# Patient Record
Sex: MALE | Race: WHITE | Employment: FULL TIME | URBAN - METROPOLITAN AREA
[De-identification: names, ages, dates, MRNs, and addresses within clinical notes are randomized per-mention and may not be internally consistent; named-entity substitution may affect disease eponyms.]

---

## 2017-08-21 ENCOUNTER — OFFICE VISIT (OUTPATIENT)
Dept: FAMILY MEDICINE CLINIC | Facility: CLINIC | Age: 57
End: 2017-08-21

## 2017-08-21 VITALS
DIASTOLIC BLOOD PRESSURE: 65 MMHG | WEIGHT: 183 LBS | HEIGHT: 69 IN | SYSTOLIC BLOOD PRESSURE: 143 MMHG | BODY MASS INDEX: 27.11 KG/M2 | HEART RATE: 73 BPM | TEMPERATURE: 98 F

## 2017-08-21 DIAGNOSIS — S16.1XXA STRAIN OF NECK MUSCLE, INITIAL ENCOUNTER: Primary | ICD-10-CM

## 2017-08-21 PROCEDURE — 99212 OFFICE O/P EST SF 10 MIN: CPT | Performed by: FAMILY MEDICINE

## 2017-08-21 PROCEDURE — 99204 OFFICE O/P NEW MOD 45 MIN: CPT | Performed by: FAMILY MEDICINE

## 2017-08-21 RX ORDER — ATORVASTATIN CALCIUM 40 MG/1
TABLET, FILM COATED ORAL
COMMUNITY
Start: 2014-10-06 | End: 2018-07-19

## 2017-08-21 RX ORDER — DICLOFENAC 35 MG/1
CAPSULE ORAL
COMMUNITY
Start: 2015-10-03 | End: 2018-05-23
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
## 2018-05-21 ENCOUNTER — NURSE TRIAGE (OUTPATIENT)
Dept: OTHER | Age: 58
End: 2018-05-21

## 2018-05-21 NOTE — TELEPHONE ENCOUNTER
Action Requested: Summary for Provider     []  Critical Lab, Recommendations Needed  [] Need Additional Advice  []   FYI    []   Need Orders  [] Need Medications Sent to Pharmacy  []  Other     SUMMARY: Received call from patient who reports he has been ha

## 2018-05-22 NOTE — TELEPHONE ENCOUNTER
Patient was instructed needs to be seen by his PCP in 3 days. No appointment notes. Can we add on?     Patient called back (offered an  and refused)  Patient stated was taken to [facility].  Was given an MRI and was inst

## 2018-05-23 ENCOUNTER — OFFICE VISIT (OUTPATIENT)
Dept: FAMILY MEDICINE CLINIC | Facility: CLINIC | Age: 58
End: 2018-05-23

## 2018-05-23 VITALS
HEART RATE: 86 BPM | DIASTOLIC BLOOD PRESSURE: 80 MMHG | WEIGHT: 176 LBS | BODY MASS INDEX: 26 KG/M2 | SYSTOLIC BLOOD PRESSURE: 138 MMHG | TEMPERATURE: 98 F

## 2018-05-23 DIAGNOSIS — R21 RASH: ICD-10-CM

## 2018-05-23 DIAGNOSIS — R51.9 ACUTE NONINTRACTABLE HEADACHE, UNSPECIFIED HEADACHE TYPE: Primary | ICD-10-CM

## 2018-05-23 PROCEDURE — 99214 OFFICE O/P EST MOD 30 MIN: CPT | Performed by: FAMILY MEDICINE

## 2018-05-23 PROCEDURE — 99212 OFFICE O/P EST SF 10 MIN: CPT | Performed by: FAMILY MEDICINE

## 2018-05-23 RX ORDER — NAPROXEN 500 MG/1
500 TABLET ORAL 2 TIMES DAILY WITH MEALS
COMMUNITY
End: 2018-07-19

## 2018-05-23 RX ORDER — MOMETASONE FUROATE 1 MG/G
CREAM TOPICAL
Qty: 30 G | Refills: 1 | Status: SHIPPED | OUTPATIENT
Start: 2018-05-23 | End: 2018-07-19

## 2018-05-23 NOTE — PROGRESS NOTES
5/23/2018  11:39 AM    Sindi Hirsch is a 61year old male. Chief complaint(s): Patient presents with:  ER F/U: ER follow up for severe headache    HPI:     Sindi Hirsch primary complaint is regarding TORREZ.       Sindi Hirsch is a 61year old male Social History: Smoking status: Former Smoker                                                              Packs/day: 0.00      Years: 0.00      Smokeless tobacco: Former User                     Comment: Quit >30  Alcohol use: Yes           0.0 oz/week Neck: Neck supple. Cardiovascular: Normal rate and regular rhythm. Pulmonary/Chest:   Clear to ascultation bilaterally. Neurological: No cranial nerve deficit. Skin: No rash noted.    UEs and scalp abrasion like rash   Psychiatric:   Appropriate

## 2018-07-19 ENCOUNTER — OFFICE VISIT (OUTPATIENT)
Dept: FAMILY MEDICINE CLINIC | Facility: CLINIC | Age: 58
End: 2018-07-19
Payer: COMMERCIAL

## 2018-07-19 ENCOUNTER — LAB ENCOUNTER (OUTPATIENT)
Dept: LAB | Age: 58
End: 2018-07-19
Attending: FAMILY MEDICINE
Payer: COMMERCIAL

## 2018-07-19 VITALS
TEMPERATURE: 98 F | HEIGHT: 69.5 IN | BODY MASS INDEX: 24.9 KG/M2 | DIASTOLIC BLOOD PRESSURE: 77 MMHG | HEART RATE: 68 BPM | SYSTOLIC BLOOD PRESSURE: 127 MMHG | WEIGHT: 172 LBS

## 2018-07-19 DIAGNOSIS — Z00.00 PHYSICAL EXAM: ICD-10-CM

## 2018-07-19 DIAGNOSIS — Z12.11 COLON CANCER SCREENING: ICD-10-CM

## 2018-07-19 DIAGNOSIS — Z00.00 PHYSICAL EXAM: Primary | ICD-10-CM

## 2018-07-19 LAB
ALBUMIN SERPL BCP-MCNC: 3.7 G/DL (ref 3.5–4.8)
ALBUMIN/GLOB SERPL: 1.5 {RATIO} (ref 1–2)
ALP SERPL-CCNC: 44 U/L (ref 32–100)
ALT SERPL-CCNC: 23 U/L (ref 17–63)
ANION GAP SERPL CALC-SCNC: 3 MMOL/L (ref 0–18)
AST SERPL-CCNC: 19 U/L (ref 15–41)
BACTERIA UR QL AUTO: NEGATIVE /HPF
BASOPHILS # BLD: 0 K/UL (ref 0–0.2)
BASOPHILS NFR BLD: 1 %
BILIRUB SERPL-MCNC: 0.7 MG/DL (ref 0.3–1.2)
BILIRUB UR QL: NEGATIVE
BUN SERPL-MCNC: 17 MG/DL (ref 8–20)
BUN/CREAT SERPL: 16.7 (ref 10–20)
CALCIUM SERPL-MCNC: 9.2 MG/DL (ref 8.5–10.5)
CHLORIDE SERPL-SCNC: 110 MMOL/L (ref 95–110)
CHOLEST SERPL-MCNC: 214 MG/DL (ref 110–200)
CLARITY UR: CLEAR
CO2 SERPL-SCNC: 27 MMOL/L (ref 22–32)
COLOR UR: YELLOW
CREAT SERPL-MCNC: 1.02 MG/DL (ref 0.5–1.5)
EOSINOPHIL # BLD: 0.1 K/UL (ref 0–0.7)
EOSINOPHIL NFR BLD: 2 %
ERYTHROCYTE [DISTWIDTH] IN BLOOD BY AUTOMATED COUNT: 13.8 % (ref 11–15)
GLOBULIN PLAS-MCNC: 2.4 G/DL (ref 2.5–3.7)
GLUCOSE SERPL-MCNC: 95 MG/DL (ref 70–99)
GLUCOSE UR-MCNC: NEGATIVE MG/DL
HBA1C MFR BLD: 5.7 % (ref 4–6)
HCT VFR BLD AUTO: 43.3 % (ref 41–52)
HDLC SERPL-MCNC: 44 MG/DL
HGB BLD-MCNC: 14.7 G/DL (ref 13.5–17.5)
HGB UR QL STRIP.AUTO: NEGATIVE
KETONES UR-MCNC: NEGATIVE MG/DL
LDLC SERPL CALC-MCNC: 154 MG/DL (ref 0–99)
LEUKOCYTE ESTERASE UR QL STRIP.AUTO: NEGATIVE
LYMPHOCYTES # BLD: 1.5 K/UL (ref 1–4)
LYMPHOCYTES NFR BLD: 31 %
MCH RBC QN AUTO: 31.9 PG (ref 27–32)
MCHC RBC AUTO-ENTMCNC: 34 G/DL (ref 32–37)
MCV RBC AUTO: 93.9 FL (ref 80–100)
MONOCYTES # BLD: 0.4 K/UL (ref 0–1)
MONOCYTES NFR BLD: 8 %
NEUTROPHILS # BLD AUTO: 3 K/UL (ref 1.8–7.7)
NEUTROPHILS NFR BLD: 59 %
NITRITE UR QL STRIP.AUTO: NEGATIVE
NONHDLC SERPL-MCNC: 170 MG/DL
OSMOLALITY UR CALC.SUM OF ELEC: 291 MOSM/KG (ref 275–295)
PATIENT FASTING: YES
PH UR: 6 [PH] (ref 5–8)
PLATELET # BLD AUTO: 222 K/UL (ref 140–400)
PMV BLD AUTO: 7.6 FL (ref 7.4–10.3)
POTASSIUM SERPL-SCNC: 4.2 MMOL/L (ref 3.3–5.1)
PROT SERPL-MCNC: 6.1 G/DL (ref 5.9–8.4)
PROT UR-MCNC: NEGATIVE MG/DL
PSA SERPL-MCNC: 1.2 NG/ML (ref 0–4)
RBC # BLD AUTO: 4.61 M/UL (ref 4.5–5.9)
RBC #/AREA URNS AUTO: 1 /HPF
SODIUM SERPL-SCNC: 140 MMOL/L (ref 136–144)
SP GR UR STRIP: 1.03 (ref 1–1.03)
TRIGL SERPL-MCNC: 82 MG/DL (ref 1–149)
TSH SERPL-ACNC: 1.67 UIU/ML (ref 0.45–5.33)
UROBILINOGEN UR STRIP-ACNC: <2
VIT C UR-MCNC: NEGATIVE MG/DL
WBC # BLD AUTO: 5 K/UL (ref 4–11)
WBC #/AREA URNS AUTO: 0 /HPF

## 2018-07-19 PROCEDURE — 36415 COLL VENOUS BLD VENIPUNCTURE: CPT

## 2018-07-19 PROCEDURE — 84153 ASSAY OF PSA TOTAL: CPT | Performed by: FAMILY MEDICINE

## 2018-07-19 PROCEDURE — 83036 HEMOGLOBIN GLYCOSYLATED A1C: CPT

## 2018-07-19 PROCEDURE — 81001 URINALYSIS AUTO W/SCOPE: CPT | Performed by: FAMILY MEDICINE

## 2018-07-19 PROCEDURE — 81015 MICROSCOPIC EXAM OF URINE: CPT | Performed by: FAMILY MEDICINE

## 2018-07-19 PROCEDURE — 82306 VITAMIN D 25 HYDROXY: CPT | Performed by: FAMILY MEDICINE

## 2018-07-19 PROCEDURE — 84443 ASSAY THYROID STIM HORMONE: CPT

## 2018-07-19 PROCEDURE — 85025 COMPLETE CBC W/AUTO DIFF WBC: CPT

## 2018-07-19 PROCEDURE — 99396 PREV VISIT EST AGE 40-64: CPT | Performed by: FAMILY MEDICINE

## 2018-07-19 PROCEDURE — 80061 LIPID PANEL: CPT

## 2018-07-19 PROCEDURE — 80053 COMPREHEN METABOLIC PANEL: CPT

## 2018-07-19 NOTE — PROGRESS NOTES
7/19/2018  9:06 AM    Aiden Nguyen is a 61year old male. Chief complaint(s): Patient presents with:  Routine Physical    HPI:     Aiden Nguyen primary complaint is regarding CPE.      Aiden Nguyen is a 61year old male is here for routine perio tinnitus. Eyes: Negative for pain and visual disturbance. Respiratory: Negative for apnea, cough, chest tightness, shortness of breath and wheezing. Cardiovascular: Negative for chest pain, palpitations and leg swelling.    Gastrointestinal: Negativ appearance and bowel sounds are normal. He exhibits no distension and no mass. There is no splenomegaly or hepatomegaly. There is no tenderness.  Hernia confirmed negative in the ventral area, confirmed negative in the right inguinal area and confirmed nega than 20 oz. a day; dental care ), and Healthy habits& Social competence & Responsibilities: Recommendations on physical activity; exercise daily or at least 3 times a week for 30-60 minutes doing cardiovascular exercise.  Encourage to maintain the best phys

## 2018-07-20 LAB — 25(OH)D3 SERPL-MCNC: 36.1 NG/ML

## 2018-08-24 ENCOUNTER — OFFICE VISIT (OUTPATIENT)
Dept: DERMATOLOGY CLINIC | Facility: CLINIC | Age: 58
End: 2018-08-24
Payer: COMMERCIAL

## 2018-08-24 DIAGNOSIS — L73.9 FOLLICULITIS: Primary | ICD-10-CM

## 2018-08-24 DIAGNOSIS — L30.9 DERMATITIS: ICD-10-CM

## 2018-08-24 PROCEDURE — 99202 OFFICE O/P NEW SF 15 MIN: CPT | Performed by: DERMATOLOGY

## 2018-08-24 PROCEDURE — 99212 OFFICE O/P EST SF 10 MIN: CPT | Performed by: DERMATOLOGY

## 2018-08-24 RX ORDER — DOXYCYCLINE HYCLATE 100 MG/1
100 CAPSULE ORAL DAILY
Qty: 30 CAPSULE | Refills: 2 | Status: SHIPPED | OUTPATIENT
Start: 2018-08-24 | End: 2018-09-23

## 2018-08-24 RX ORDER — CLOBETASOL PROPIONATE 0.46 MG/ML
1 SOLUTION TOPICAL 2 TIMES DAILY
Qty: 50 ML | Refills: 6 | Status: SHIPPED | OUTPATIENT
Start: 2018-08-24 | End: 2019-08-24

## 2018-09-03 NOTE — PROGRESS NOTES
Nahed Whitehead is a 61year old male. Patient presents with:  Derm Problem: New pt presents with pruritic lesions to scalp, face, and bilateral arms for 3-4 years. Has tried mometasone with min improvement. Patient has no known allergies. History Main Topics   Smoking status: Former Smoker     Smokeless tobacco: Former User    Comment: Quit >30    Alcohol use Yes  0.0 oz/week     Comment: occ    Drug use: No    Sexual activity: Not on file     Other Topics Concern    Reaction to local anest diagnosis)  Dermatitis      Dermatitis.  /Folliculitis. Mupirocin to new excoriated crusted areas. Clobetasol to scalp may use this over new areas on arms and legs. Taper off doxycycline. The fact this may aggravate his peptic ulcer disease discussed.

## 2019-02-14 ENCOUNTER — NURSE TRIAGE (OUTPATIENT)
Dept: OTHER | Age: 59
End: 2019-02-14

## 2019-02-14 NOTE — TELEPHONE ENCOUNTER
Action Requested: Summary for Provider     []  Critical Lab, Recommendations Needed  [] Need Additional Advice  []   FYI    []   Need Orders  [] Need Medications Sent to Pharmacy  []  Other     SUMMARY: Pt going to [facility] for further evaluation of left eye di

## 2019-02-16 ENCOUNTER — OFFICE VISIT (OUTPATIENT)
Dept: FAMILY MEDICINE CLINIC | Facility: CLINIC | Age: 59
End: 2019-02-16
Payer: COMMERCIAL

## 2019-02-16 VITALS
BODY MASS INDEX: 26.5 KG/M2 | DIASTOLIC BLOOD PRESSURE: 77 MMHG | SYSTOLIC BLOOD PRESSURE: 128 MMHG | RESPIRATION RATE: 18 BRPM | TEMPERATURE: 97 F | HEART RATE: 78 BPM | HEIGHT: 69.5 IN | WEIGHT: 183 LBS

## 2019-02-16 DIAGNOSIS — H00.036 CELLULITIS OF LEFT EYELID: Primary | ICD-10-CM

## 2019-02-16 DIAGNOSIS — H10.32 ACUTE BACTERIAL CONJUNCTIVITIS OF LEFT EYE: ICD-10-CM

## 2019-02-16 PROCEDURE — 99213 OFFICE O/P EST LOW 20 MIN: CPT | Performed by: FAMILY MEDICINE

## 2019-02-16 PROCEDURE — 99212 OFFICE O/P EST SF 10 MIN: CPT | Performed by: FAMILY MEDICINE

## 2019-02-16 RX ORDER — AMOXICILLIN 500 MG/1
500 CAPSULE ORAL 2 TIMES DAILY
Qty: 20 CAPSULE | Refills: 0 | Status: SHIPPED | OUTPATIENT
Start: 2019-02-16 | End: 2019-02-26

## 2019-02-16 RX ORDER — NEOMYCIN/POLYMYXIN B/HYDROCORT 3.5-10K-1
SUSPENSION, DROPS(FINAL DOSAGE FORM)(ML) OPHTHALMIC (EYE)
Qty: 1 BOTTLE | Refills: 0 | Status: SHIPPED | OUTPATIENT
Start: 2019-02-16 | End: 2021-10-11 | Stop reason: ALTCHOICE

## 2019-02-16 NOTE — PROGRESS NOTES
2/16/2019  12:21 PM    Josh Arita is a 60year old male. Chief complaint(s): Patient presents with:   Infection: Patient present with left eye infected x 5 days - swollen,itchy and painful    HPI:     Josh Arita primary complaint is regarding a Oral Cap Take 1 capsule (500 mg total) by mouth 2 (two) times daily for 10 days. Disp: 20 capsule Rfl: 0   Clobetasol Propionate 0.05 % External Solution Apply 1 mL topically 2 (two) times daily.  Disp: 50 mL Rfl: 6   mupirocin 2 % External Ointment Apply 1 Cellulitis of left eyelid  (primary encounter diagnosis)  Acute bacterial conjunctivitis of left eye      MEDICATIONS:   Requested Prescriptions     Signed Prescriptions Disp Refills   • Neomycin-Polymyxin-HC [id number] Ophthalmic Suspension 1 Bottle 0

## 2019-05-15 ENCOUNTER — APPOINTMENT (OUTPATIENT)
Dept: CT IMAGING | Facility: HOSPITAL | Age: 59
End: 2019-05-15
Attending: EMERGENCY MEDICINE
Payer: COMMERCIAL

## 2019-05-15 ENCOUNTER — HOSPITAL ENCOUNTER (EMERGENCY)
Facility: HOSPITAL | Age: 59
Discharge: HOME | End: 2019-05-15
Attending: EMERGENCY MEDICINE
Payer: COMMERCIAL

## 2019-05-15 VITALS
OXYGEN SATURATION: 97 % | TEMPERATURE: 98 F | DIASTOLIC BLOOD PRESSURE: 91 MMHG | HEART RATE: 95 BPM | BODY MASS INDEX: 27.11 KG/M2 | RESPIRATION RATE: 20 BRPM | HEIGHT: 69 IN | SYSTOLIC BLOOD PRESSURE: 139 MMHG | WEIGHT: 183 LBS

## 2019-05-15 DIAGNOSIS — N20.1 URETERAL STONE: ICD-10-CM

## 2019-05-15 DIAGNOSIS — N23 RENAL COLIC: Primary | ICD-10-CM

## 2019-05-15 PROCEDURE — 74176 CT ABD & PELVIS W/O CONTRAST: CPT | Performed by: EMERGENCY MEDICINE

## 2019-05-15 PROCEDURE — 96365 THER/PROPH/DIAG IV INF INIT: CPT

## 2019-05-15 PROCEDURE — 85025 COMPLETE CBC W/AUTO DIFF WBC: CPT | Performed by: EMERGENCY MEDICINE

## 2019-05-15 PROCEDURE — 80048 BASIC METABOLIC PNL TOTAL CA: CPT | Performed by: EMERGENCY MEDICINE

## 2019-05-15 PROCEDURE — 81003 URINALYSIS AUTO W/O SCOPE: CPT | Performed by: EMERGENCY MEDICINE

## 2019-05-15 PROCEDURE — 99284 EMERGENCY DEPT VISIT MOD MDM: CPT

## 2019-05-15 RX ORDER — TAMSULOSIN HYDROCHLORIDE 0.4 MG/1
0.4 CAPSULE ORAL DAILY
Qty: 7 CAPSULE | Refills: 0 | Status: SHIPPED | OUTPATIENT
Start: 2019-05-15 | End: 2019-05-22

## 2019-05-15 RX ORDER — KETOROLAC TROMETHAMINE 15 MG/ML
15 INJECTION, SOLUTION INTRAMUSCULAR; INTRAVENOUS ONCE
Status: COMPLETED | OUTPATIENT
Start: 2019-05-15 | End: 2019-05-15

## 2019-05-15 RX ORDER — KETOROLAC TROMETHAMINE 10 MG/1
10 TABLET, FILM COATED ORAL EVERY 6 HOURS PRN
Qty: 20 TABLET | Refills: 0 | Status: SHIPPED | OUTPATIENT
Start: 2019-05-15 | End: 2019-05-20

## 2019-05-15 NOTE — ED PROVIDER NOTES
Patient Seen in: [facility] AND [facility] Emergency Department    History   Patient presents with:  Abdomen/Flank Pain (GI/)    Stated Complaint: abd pain    HPI    Patient is a 62-year-old male with no no significant past medical history at onset about 1030 Pulmonary/Chest: Effort normal and breath sounds normal. No respiratory distress. Abdominal: Soft. Bowel sounds are normal. Exhibits no distension and no mass. There is tenderness in the right flank and right lower quadrant.   Here is no rebound and no calculus. 3. Moderate prostate enlargement. 4. Mild hepatic steatosis. 5. Nonspecific coronary artery calcification. 6. Small bilateral fat containing inguinal hernias and tiny fat containing umbilical hernia.  7.    Dictated by (CST): Lida Romero MD on

## 2020-11-05 ENCOUNTER — HOSPITAL ENCOUNTER (OUTPATIENT)
Age: 60
Discharge: HOME | End: 2020-11-05
Attending: EMERGENCY MEDICINE
Payer: COMMERCIAL

## 2020-11-05 VITALS
SYSTOLIC BLOOD PRESSURE: 125 MMHG | DIASTOLIC BLOOD PRESSURE: 68 MMHG | TEMPERATURE: 99 F | HEART RATE: 88 BPM | RESPIRATION RATE: 20 BRPM | OXYGEN SATURATION: 98 %

## 2020-11-05 DIAGNOSIS — B34.9 VIRAL SYNDROME: Primary | ICD-10-CM

## 2020-11-05 DIAGNOSIS — Z20.822 ENCOUNTER FOR LABORATORY TESTING FOR COVID-19 VIRUS: ICD-10-CM

## 2020-11-05 PROCEDURE — 99213 OFFICE O/P EST LOW 20 MIN: CPT | Performed by: EMERGENCY MEDICINE

## 2020-11-06 NOTE — ED INITIAL ASSESSMENT (HPI)
2 days of headache and body aches, could have been exposed to covid, states he's never had this type of headache.  Pain is on top of head

## 2020-11-06 NOTE — ED PROVIDER NOTES
Patient Seen in: [facility]      History   Patient presents with:  Headache    Stated Complaint: HA    HPI  Patient complains of body aches, fatigue, headache and chills. He thinks he was having fevers.   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      Left Ear: External ear normal.   Eyes:      Conjunctiva/sclera: Conjunctivae normal.   Neck:      Musculoskeletal: Normal range of motion and neck supple.    Cardiovascular:      Rate and Rhythm: Normal rate and reg as soon as possible for a visit in 2 days  For follow-up          Medications Prescribed:  Current Discharge Medication List

## 2020-11-12 ENCOUNTER — TELEPHONE (OUTPATIENT)
Dept: FAMILY MEDICINE CLINIC | Facility: CLINIC | Age: 60
End: 2020-11-12

## 2020-11-12 ENCOUNTER — TELEMEDICINE (OUTPATIENT)
Dept: FAMILY MEDICINE CLINIC | Facility: CLINIC | Age: 60
End: 2020-11-12
Payer: COMMERCIAL

## 2020-11-12 DIAGNOSIS — U07.1 COVID-19 VIRUS INFECTION: Primary | ICD-10-CM

## 2020-11-12 PROCEDURE — 99213 OFFICE O/P EST LOW 20 MIN: CPT | Performed by: PHYSICIAN ASSISTANT

## 2020-11-13 ENCOUNTER — TELEPHONE (OUTPATIENT)
Dept: FAMILY MEDICINE CLINIC | Facility: CLINIC | Age: 60
End: 2020-11-13

## 2020-11-13 NOTE — PROGRESS NOTES
HPI: HPI  Patient was diagnosed with positive Covid 19 on 11/5/20. Patient complaints of sore throat, lack of appetite and headache. Patient denies of chest pain, SOB, N/V/C/D, fever, dizziness, syncope, abdominal pain.  There are no other concerns toda status: Former Smoker      Smokeless tobacco: Former User      Tobacco comment: Quit >30    Substance and Sexual Activity      Alcohol use:  Yes        Alcohol/week: 0.0 standard drinks        Comment: occ      Drug use: No      Sexual activity: Not on file orient x3, able to carry on conversation easily, without shortness of breath, coughing, can speak in full sentences.     Assessment and Plan[de-identified]     Problem List Items Addressed This Visit     None      Visit Diagnoses     COVID-19 virus infection    -  Prima

## 2020-11-13 NOTE — TELEPHONE ENCOUNTER
Received [facility] paperwork via fax at [facility] location.

## 2020-11-19 NOTE — TELEPHONE ENCOUNTER
Rashad,     Please sign off on form: FMLA 11/5/20- pending recovery   -Highlight the patient and hit \"Chart\" button. -In Chart Review, w/in the Encounter tab - click 1 time on the Telephone call encounter for 11/13/20 Scroll down the telephone encounter.

## 2021-10-11 ENCOUNTER — OFFICE VISIT (OUTPATIENT)
Dept: FAMILY MEDICINE CLINIC | Facility: CLINIC | Age: 61
End: 2021-10-11
Payer: COMMERCIAL

## 2021-10-11 ENCOUNTER — TELEPHONE (OUTPATIENT)
Dept: FAMILY MEDICINE CLINIC | Facility: CLINIC | Age: 61
End: 2021-10-11

## 2021-10-11 VITALS
RESPIRATION RATE: 18 BRPM | BODY MASS INDEX: 26.99 KG/M2 | DIASTOLIC BLOOD PRESSURE: 70 MMHG | SYSTOLIC BLOOD PRESSURE: 134 MMHG | HEART RATE: 77 BPM | WEIGHT: 182.25 LBS | HEIGHT: 69 IN

## 2021-10-11 DIAGNOSIS — Z12.11 COLON CANCER SCREENING: ICD-10-CM

## 2021-10-11 DIAGNOSIS — Z00.00 PHYSICAL EXAM: Primary | ICD-10-CM

## 2021-10-11 PROCEDURE — 99396 PREV VISIT EST AGE 40-64: CPT | Performed by: FAMILY MEDICINE

## 2021-10-11 PROCEDURE — 90686 IIV4 VACC NO PRSV 0.5 ML IM: CPT | Performed by: FAMILY MEDICINE

## 2021-10-11 PROCEDURE — 90471 IMMUNIZATION ADMIN: CPT | Performed by: FAMILY MEDICINE

## 2021-10-11 PROCEDURE — 3075F SYST BP GE 130 - 139MM HG: CPT | Performed by: FAMILY MEDICINE

## 2021-10-11 PROCEDURE — 3008F BODY MASS INDEX DOCD: CPT | Performed by: FAMILY MEDICINE

## 2021-10-11 PROCEDURE — 3078F DIAST BP <80 MM HG: CPT | Performed by: FAMILY MEDICINE

## 2021-10-11 NOTE — TELEPHONE ENCOUNTER
Patient can schedule an appointment whenever he wishes for his shingles vaccine as he refused to get it on todays visit. Please help pt schedule a Nurse visit.

## 2021-10-11 NOTE — PROGRESS NOTES
10/11/2021  12:02 PM    Gibson Lepe is a 57year old male. Chief complaint(s): Patient presents with:  Routine Physical    HPI:     Gibson Lepe primary complaint is regarding CPE.      Gibson Lepe is a 57year old male is here for routine per External Ointment Apply 1 Application topically 3 (three) times daily. (Patient not taking: Reported on 10/11/2021) 15 g 3       Allergies:  No Known Allergies      ROS:   Review of Systems   Constitutional: Negative for appetite change, fatigue and fever. normal.      Breath sounds: Normal breath sounds. Abdominal:      General: Abdomen is flat. Bowel sounds are normal.      Palpations: Abdomen is soft. There is no hepatomegaly, splenomegaly or mass. Tenderness: There is no abdominal tenderness. and high-carbohydrate foods); athletic conditioning, fluids; low fat milk, limit to less than 20 oz. a day; dental care ), and Healthy habits& Social competence & Responsibilities: Recommendations on physical activity; exercise daily or at least 3 times a

## 2021-10-11 NOTE — TELEPHONE ENCOUNTER
Patient is wanting to know when he can schedule for shingles vaccine. No order in system. Please advise.

## 2021-10-30 ENCOUNTER — LAB ENCOUNTER (OUTPATIENT)
Dept: LAB | Age: 61
End: 2021-10-30
Attending: FAMILY MEDICINE
Payer: COMMERCIAL

## 2021-10-30 ENCOUNTER — EKG ENCOUNTER (OUTPATIENT)
Dept: LAB | Age: 61
End: 2021-10-30
Attending: FAMILY MEDICINE
Payer: COMMERCIAL

## 2021-10-30 DIAGNOSIS — Z00.00 PHYSICAL EXAM: ICD-10-CM

## 2021-10-30 PROCEDURE — 93010 ELECTROCARDIOGRAM REPORT: CPT | Performed by: FAMILY MEDICINE

## 2021-10-30 PROCEDURE — 93005 ELECTROCARDIOGRAM TRACING: CPT

## 2021-10-30 PROCEDURE — 81001 URINALYSIS AUTO W/SCOPE: CPT | Performed by: FAMILY MEDICINE

## 2021-10-30 PROCEDURE — 85025 COMPLETE CBC W/AUTO DIFF WBC: CPT

## 2021-10-30 PROCEDURE — 83036 HEMOGLOBIN GLYCOSYLATED A1C: CPT

## 2021-10-30 PROCEDURE — 36415 COLL VENOUS BLD VENIPUNCTURE: CPT

## 2021-10-30 PROCEDURE — 84443 ASSAY THYROID STIM HORMONE: CPT

## 2021-10-30 PROCEDURE — 81015 MICROSCOPIC EXAM OF URINE: CPT | Performed by: FAMILY MEDICINE

## 2021-10-30 PROCEDURE — 82306 VITAMIN D 25 HYDROXY: CPT | Performed by: FAMILY MEDICINE

## 2021-10-30 PROCEDURE — 80061 LIPID PANEL: CPT

## 2021-10-30 PROCEDURE — 80053 COMPREHEN METABOLIC PANEL: CPT

## 2021-10-30 PROCEDURE — 84153 ASSAY OF PSA TOTAL: CPT | Performed by: FAMILY MEDICINE

## 2021-10-30 RX ORDER — ERGOCALCIFEROL 1.25 MG/1
50000 CAPSULE ORAL WEEKLY
Qty: 12 CAPSULE | Refills: 4 | Status: SHIPPED | OUTPATIENT
Start: 2021-10-30 | End: 2021-11-29

## 2021-11-13 ENCOUNTER — NURSE ONLY (OUTPATIENT)
Dept: FAMILY MEDICINE CLINIC | Facility: CLINIC | Age: 61
End: 2021-11-13
Payer: COMMERCIAL

## 2021-11-13 DIAGNOSIS — Z23 IMMUNIZATION DUE: Primary | ICD-10-CM

## 2021-11-13 PROCEDURE — 90750 HZV VACC RECOMBINANT IM: CPT | Performed by: FAMILY MEDICINE

## 2021-11-13 PROCEDURE — 90471 IMMUNIZATION ADMIN: CPT | Performed by: FAMILY MEDICINE

## 2021-11-15 NOTE — PROGRESS NOTES
Patient here for shingles vaccine. Verbal consent obtained to administer. Patient tolerated vaccine well advised to f/u in 2 months for second dose.

## 2022-01-15 ENCOUNTER — NURSE ONLY (OUTPATIENT)
Dept: FAMILY MEDICINE CLINIC | Facility: CLINIC | Age: 62
End: 2022-01-15
Payer: COMMERCIAL

## 2022-01-15 DIAGNOSIS — Z23 NEED FOR SHINGLES VACCINE: Primary | ICD-10-CM

## 2022-01-15 PROCEDURE — 90750 HZV VACC RECOMBINANT IM: CPT | Performed by: FAMILY MEDICINE

## 2022-01-15 PROCEDURE — 90471 IMMUNIZATION ADMIN: CPT | Performed by: FAMILY MEDICINE

## 2022-01-15 NOTE — PROGRESS NOTES
Patient is here for the second shingles vaccines. Verified full name and . Patient tolerated vaccine well.

## 2022-08-17 ENCOUNTER — HOSPITAL ENCOUNTER (OUTPATIENT)
Age: 62
Discharge: HOME | End: 2022-08-17
Payer: COMMERCIAL

## 2022-08-17 VITALS
HEIGHT: 69 IN | HEART RATE: 92 BPM | WEIGHT: 185 LBS | OXYGEN SATURATION: 99 % | TEMPERATURE: 98 F | SYSTOLIC BLOOD PRESSURE: 141 MMHG | RESPIRATION RATE: 18 BRPM | DIASTOLIC BLOOD PRESSURE: 82 MMHG | BODY MASS INDEX: 27.4 KG/M2

## 2022-08-17 DIAGNOSIS — Z20.822 ENCOUNTER FOR LABORATORY TESTING FOR COVID-19 VIRUS: Primary | ICD-10-CM

## 2022-08-17 DIAGNOSIS — U07.1 COVID: ICD-10-CM

## 2022-08-17 LAB — SARS-COV-2 RNA RESP QL NAA+PROBE: DETECTED

## 2022-08-17 PROCEDURE — 99213 OFFICE O/P EST LOW 20 MIN: CPT | Performed by: NURSE PRACTITIONER

## 2022-08-17 PROCEDURE — U0002 COVID-19 LAB TEST NON-CDC: HCPCS | Performed by: NURSE PRACTITIONER

## 2022-09-02 ENCOUNTER — NURSE TRIAGE (OUTPATIENT)
Dept: FAMILY MEDICINE CLINIC | Facility: CLINIC | Age: 62
End: 2022-09-02

## 2022-09-04 NOTE — TELEPHONE ENCOUNTER
Patient does not use MyChart. Please call pt on 9/6/22 when we return to office. At time of this note, patient has not visited an [facility] ED or IC, please seek a f/u from him.

## 2022-12-03 ENCOUNTER — OFFICE VISIT (OUTPATIENT)
Dept: FAMILY MEDICINE CLINIC | Facility: CLINIC | Age: 62
End: 2022-12-03
Payer: COMMERCIAL

## 2022-12-03 VITALS
DIASTOLIC BLOOD PRESSURE: 81 MMHG | HEIGHT: 69 IN | SYSTOLIC BLOOD PRESSURE: 159 MMHG | HEART RATE: 85 BPM | BODY MASS INDEX: 27.11 KG/M2 | WEIGHT: 183 LBS

## 2022-12-03 DIAGNOSIS — Z12.11 COLON CANCER SCREENING: ICD-10-CM

## 2022-12-03 DIAGNOSIS — Z00.00 PHYSICAL EXAM: Primary | ICD-10-CM

## 2022-12-03 PROCEDURE — 3008F BODY MASS INDEX DOCD: CPT | Performed by: FAMILY MEDICINE

## 2022-12-03 PROCEDURE — 90686 IIV4 VACC NO PRSV 0.5 ML IM: CPT | Performed by: FAMILY MEDICINE

## 2022-12-03 PROCEDURE — 3079F DIAST BP 80-89 MM HG: CPT | Performed by: FAMILY MEDICINE

## 2022-12-03 PROCEDURE — 99396 PREV VISIT EST AGE 40-64: CPT | Performed by: FAMILY MEDICINE

## 2022-12-03 PROCEDURE — 90471 IMMUNIZATION ADMIN: CPT | Performed by: FAMILY MEDICINE

## 2022-12-03 PROCEDURE — 3077F SYST BP >= 140 MM HG: CPT | Performed by: FAMILY MEDICINE

## 2022-12-17 ENCOUNTER — LAB ENCOUNTER (OUTPATIENT)
Dept: LAB | Age: 62
End: 2022-12-17
Attending: FAMILY MEDICINE
Payer: COMMERCIAL

## 2022-12-17 ENCOUNTER — EKG ENCOUNTER (OUTPATIENT)
Dept: LAB | Age: 62
End: 2022-12-17
Attending: FAMILY MEDICINE
Payer: COMMERCIAL

## 2022-12-17 DIAGNOSIS — Z00.00 PHYSICAL EXAM: ICD-10-CM

## 2022-12-17 LAB
ALBUMIN SERPL-MCNC: 4 G/DL (ref 3.4–5)
ALBUMIN/GLOB SERPL: 1.4 {RATIO} (ref 1–2)
ALP LIVER SERPL-CCNC: 62 U/L
ALT SERPL-CCNC: 32 U/L
ANION GAP SERPL CALC-SCNC: 4 MMOL/L (ref 0–18)
AST SERPL-CCNC: 15 U/L (ref 15–37)
BASOPHILS # BLD AUTO: 0.05 X10(3) UL (ref 0–0.2)
BASOPHILS NFR BLD AUTO: 1 %
BILIRUB SERPL-MCNC: 0.6 MG/DL (ref 0.1–2)
BILIRUB UR QL: NEGATIVE
BUN BLD-MCNC: 17 MG/DL (ref 7–18)
BUN/CREAT SERPL: 16.3 (ref 10–20)
CALCIUM BLD-MCNC: 9.4 MG/DL (ref 8.5–10.1)
CHLORIDE SERPL-SCNC: 107 MMOL/L (ref 98–112)
CHOLEST SERPL-MCNC: 237 MG/DL (ref ?–200)
CLARITY UR: CLEAR
CO2 SERPL-SCNC: 30 MMOL/L (ref 21–32)
COLOR UR: YELLOW
CREAT BLD-MCNC: 1.04 MG/DL
DEPRECATED RDW RBC AUTO: 43.2 FL (ref 35.1–46.3)
EOSINOPHIL # BLD AUTO: 0.1 X10(3) UL (ref 0–0.7)
EOSINOPHIL NFR BLD AUTO: 2 %
ERYTHROCYTE [DISTWIDTH] IN BLOOD BY AUTOMATED COUNT: 12.4 % (ref 11–15)
EST. AVERAGE GLUCOSE BLD GHB EST-MCNC: 128 MG/DL (ref 68–126)
FASTING PATIENT LIPID ANSWER: YES
FASTING STATUS PATIENT QL REPORTED: YES
GFR SERPLBLD BASED ON 1.73 SQ M-ARVRAT: 81 ML/MIN/1.73M2 (ref 60–?)
GLOBULIN PLAS-MCNC: 2.9 G/DL (ref 2.8–4.4)
GLUCOSE BLD-MCNC: 104 MG/DL (ref 70–99)
GLUCOSE UR-MCNC: NEGATIVE MG/DL
HBA1C MFR BLD: 6.1 % (ref ?–5.7)
HCT VFR BLD AUTO: 45.5 %
HDLC SERPL-MCNC: 45 MG/DL (ref 40–59)
HEMOCCULT STL QL: POSITIVE
HGB BLD-MCNC: 15.3 G/DL
HGB UR QL STRIP.AUTO: NEGATIVE
IMM GRANULOCYTES # BLD AUTO: 0.02 X10(3) UL (ref 0–1)
IMM GRANULOCYTES NFR BLD: 0.4 %
KETONES UR-MCNC: NEGATIVE MG/DL
LDLC SERPL CALC-MCNC: 162 MG/DL (ref ?–100)
LEUKOCYTE ESTERASE UR QL STRIP.AUTO: NEGATIVE
LYMPHOCYTES # BLD AUTO: 1.62 X10(3) UL (ref 1–4)
LYMPHOCYTES NFR BLD AUTO: 33.1 %
MCH RBC QN AUTO: 31.5 PG (ref 26–34)
MCHC RBC AUTO-ENTMCNC: 33.6 G/DL (ref 31–37)
MCV RBC AUTO: 93.8 FL
MONOCYTES # BLD AUTO: 0.34 X10(3) UL (ref 0.1–1)
MONOCYTES NFR BLD AUTO: 7 %
NEUTROPHILS # BLD AUTO: 2.76 X10 (3) UL (ref 1.5–7.7)
NEUTROPHILS # BLD AUTO: 2.76 X10(3) UL (ref 1.5–7.7)
NEUTROPHILS NFR BLD AUTO: 56.5 %
NITRITE UR QL STRIP.AUTO: NEGATIVE
NONHDLC SERPL-MCNC: 192 MG/DL (ref ?–130)
OSMOLALITY SERPL CALC.SUM OF ELEC: 294 MOSM/KG (ref 275–295)
PH UR: 7 [PH] (ref 5–8)
PLATELET # BLD AUTO: 248 10(3)UL (ref 150–450)
POTASSIUM SERPL-SCNC: 4.3 MMOL/L (ref 3.5–5.1)
PROT SERPL-MCNC: 6.9 G/DL (ref 6.4–8.2)
PROT UR-MCNC: NEGATIVE MG/DL
PSA SERPL-MCNC: 3.02 NG/ML (ref ?–4)
RBC # BLD AUTO: 4.85 X10(6)UL
SODIUM SERPL-SCNC: 141 MMOL/L (ref 136–145)
SP GR UR STRIP: 1.02 (ref 1–1.03)
TRIGL SERPL-MCNC: 164 MG/DL (ref 30–149)
TSI SER-ACNC: 2.5 MIU/ML (ref 0.36–3.74)
UROBILINOGEN UR STRIP-ACNC: 0.2
VIT D+METAB SERPL-MCNC: 25.8 NG/ML (ref 30–100)
VLDLC SERPL CALC-MCNC: 32 MG/DL (ref 0–30)
WBC # BLD AUTO: 4.9 X10(3) UL (ref 4–11)

## 2022-12-17 PROCEDURE — 84443 ASSAY THYROID STIM HORMONE: CPT

## 2022-12-17 PROCEDURE — 83036 HEMOGLOBIN GLYCOSYLATED A1C: CPT

## 2022-12-17 PROCEDURE — 80061 LIPID PANEL: CPT

## 2022-12-17 PROCEDURE — 93005 ELECTROCARDIOGRAM TRACING: CPT

## 2022-12-17 PROCEDURE — 82306 VITAMIN D 25 HYDROXY: CPT

## 2022-12-17 PROCEDURE — 85025 COMPLETE CBC W/AUTO DIFF WBC: CPT

## 2022-12-17 PROCEDURE — 93010 ELECTROCARDIOGRAM REPORT: CPT | Performed by: INTERNAL MEDICINE

## 2022-12-17 PROCEDURE — 82274 ASSAY TEST FOR BLOOD FECAL: CPT | Performed by: FAMILY MEDICINE

## 2022-12-17 PROCEDURE — 84153 ASSAY OF PSA TOTAL: CPT

## 2022-12-17 PROCEDURE — 36415 COLL VENOUS BLD VENIPUNCTURE: CPT

## 2022-12-17 PROCEDURE — 81003 URINALYSIS AUTO W/O SCOPE: CPT

## 2022-12-17 PROCEDURE — 80053 COMPREHEN METABOLIC PANEL: CPT

## 2022-12-18 DIAGNOSIS — Z12.11 COLON CANCER SCREENING: ICD-10-CM

## 2022-12-18 DIAGNOSIS — R19.5 POSITIVE OCCULT STOOL BLOOD TEST: Primary | ICD-10-CM

## 2022-12-18 RX ORDER — ERGOCALCIFEROL 1.25 MG/1
50000 CAPSULE ORAL WEEKLY
Qty: 12 CAPSULE | Refills: 4 | Status: SHIPPED | OUTPATIENT
Start: 2022-12-18 | End: 2023-01-17

## 2022-12-19 LAB
ATRIAL RATE: 66 BPM
P AXIS: 68 DEGREES
P-R INTERVAL: 176 MS
Q-T INTERVAL: 390 MS
QRS DURATION: 96 MS
QTC CALCULATION (BEZET): 408 MS
R AXIS: -13 DEGREES
T AXIS: 27 DEGREES
VENTRICULAR RATE: 66 BPM

## 2023-12-05 ENCOUNTER — HOSPITAL ENCOUNTER (OUTPATIENT)
Age: 63
Discharge: HOME | End: 2023-12-05
Payer: COMMERCIAL

## 2023-12-05 ENCOUNTER — NURSE TRIAGE (OUTPATIENT)
Dept: FAMILY MEDICINE CLINIC | Facility: CLINIC | Age: 63
End: 2023-12-05

## 2023-12-05 VITALS
SYSTOLIC BLOOD PRESSURE: 139 MMHG | HEART RATE: 103 BPM | TEMPERATURE: 97 F | RESPIRATION RATE: 22 BRPM | OXYGEN SATURATION: 95 % | DIASTOLIC BLOOD PRESSURE: 74 MMHG

## 2023-12-05 DIAGNOSIS — J06.9 VIRAL UPPER RESPIRATORY TRACT INFECTION: ICD-10-CM

## 2023-12-05 DIAGNOSIS — R05.9 COUGH: Primary | ICD-10-CM

## 2023-12-05 LAB
S PYO AG THROAT QL: NEGATIVE
SARS-COV-2 RNA RESP QL NAA+PROBE: NOT DETECTED

## 2023-12-05 PROCEDURE — 87880 STREP A ASSAY W/OPTIC: CPT | Performed by: NURSE PRACTITIONER

## 2023-12-05 PROCEDURE — 99213 OFFICE O/P EST LOW 20 MIN: CPT | Performed by: NURSE PRACTITIONER

## 2023-12-05 PROCEDURE — U0002 COVID-19 LAB TEST NON-CDC: HCPCS | Performed by: NURSE PRACTITIONER

## 2023-12-05 RX ORDER — BENZONATATE 100 MG/1
100 CAPSULE ORAL 3 TIMES DAILY PRN
Qty: 30 CAPSULE | Refills: 0 | Status: SHIPPED | OUTPATIENT
Start: 2023-12-05 | End: 2024-01-04

## 2023-12-05 NOTE — TELEPHONE ENCOUNTER
Patient seeking appt today. Patient c/o cough, and bodyaches. Symptoms for 8 days. Cough is constant. No sob, no wheezing. Patient needs evaluation. Appt given for available provider at [address] [place] [address] today.        Reason for Disposition   Continuous (nonstop) coughing interferes with work or school and no improvement using cough treatment per Care Advice    Protocols used: Cough-A-OH

## 2024-03-09 ENCOUNTER — LAB ENCOUNTER (OUTPATIENT)
Dept: LAB | Age: 64
End: 2024-03-09
Attending: FAMILY MEDICINE
Payer: COMMERCIAL

## 2024-03-09 ENCOUNTER — OFFICE VISIT (OUTPATIENT)
Dept: FAMILY MEDICINE CLINIC | Facility: CLINIC | Age: 64
End: 2024-03-09
Payer: COMMERCIAL

## 2024-03-09 VITALS
HEART RATE: 68 BPM | HEIGHT: 69 IN | SYSTOLIC BLOOD PRESSURE: 160 MMHG | WEIGHT: 187 LBS | BODY MASS INDEX: 27.7 KG/M2 | DIASTOLIC BLOOD PRESSURE: 80 MMHG

## 2024-03-09 DIAGNOSIS — Z13.31 POSITIVE SCREENING FOR DEPRESSION ON 9-ITEM PATIENT HEALTH QUESTIONNAIRE (PHQ-9): Chronic | ICD-10-CM

## 2024-03-09 DIAGNOSIS — F32.A DEPRESSIVE DISORDER: Primary | Chronic | ICD-10-CM

## 2024-03-09 DIAGNOSIS — R73.9 HYPERGLYCEMIA: ICD-10-CM

## 2024-03-09 DIAGNOSIS — I10 ESSENTIAL HYPERTENSION: Chronic | ICD-10-CM

## 2024-03-09 LAB
ANION GAP SERPL CALC-SCNC: 4 MMOL/L (ref 0–18)
BILIRUB UR QL: NEGATIVE
BUN BLD-MCNC: 12 MG/DL (ref 9–23)
BUN/CREAT SERPL: 12.6 (ref 10–20)
CALCIUM BLD-MCNC: 9.6 MG/DL (ref 8.7–10.4)
CHLORIDE SERPL-SCNC: 108 MMOL/L (ref 98–112)
CLARITY UR: CLEAR
CO2 SERPL-SCNC: 29 MMOL/L (ref 21–32)
CREAT BLD-MCNC: 0.95 MG/DL
EGFRCR SERPLBLD CKD-EPI 2021: 89 ML/MIN/1.73M2 (ref 60–?)
EST. AVERAGE GLUCOSE BLD GHB EST-MCNC: 128 MG/DL (ref 68–126)
FASTING STATUS PATIENT QL REPORTED: NO
GLUCOSE BLD-MCNC: 170 MG/DL (ref 70–99)
GLUCOSE UR-MCNC: NORMAL MG/DL
HBA1C MFR BLD: 6.1 % (ref ?–5.7)
HGB UR QL STRIP.AUTO: NEGATIVE
KETONES UR-MCNC: NEGATIVE MG/DL
LEUKOCYTE ESTERASE UR QL STRIP.AUTO: NEGATIVE
NITRITE UR QL STRIP.AUTO: NEGATIVE
OSMOLALITY SERPL CALC.SUM OF ELEC: 296 MOSM/KG (ref 275–295)
PH UR: 6.5 [PH] (ref 5–8)
POTASSIUM SERPL-SCNC: 4 MMOL/L (ref 3.5–5.1)
PROT UR-MCNC: NEGATIVE MG/DL
SODIUM SERPL-SCNC: 141 MMOL/L (ref 136–145)
SP GR UR STRIP: 1.01 (ref 1–1.03)
UROBILINOGEN UR STRIP-ACNC: NORMAL

## 2024-03-09 PROCEDURE — 36415 COLL VENOUS BLD VENIPUNCTURE: CPT

## 2024-03-09 PROCEDURE — 81003 URINALYSIS AUTO W/O SCOPE: CPT

## 2024-03-09 PROCEDURE — 80048 BASIC METABOLIC PNL TOTAL CA: CPT

## 2024-03-09 PROCEDURE — 83036 HEMOGLOBIN GLYCOSYLATED A1C: CPT

## 2024-03-09 PROCEDURE — 99215 OFFICE O/P EST HI 40 MIN: CPT | Performed by: FAMILY MEDICINE

## 2024-03-09 RX ORDER — AMLODIPINE BESYLATE AND BENAZEPRIL HYDROCHLORIDE 5; 20 MG/1; MG/1
1 CAPSULE ORAL DAILY
Qty: 90 CAPSULE | Refills: 0 | Status: SHIPPED | OUTPATIENT
Start: 2024-03-09

## 2024-03-09 NOTE — PROGRESS NOTES
Patient ID: Quentin Guadalupe is a 63 year old male.    HPI  Chief Complaint   Patient presents with    Depression     This is the first time the patient is seen by me. Patient usually sees Dr. Easton.     Pt is  and is a .    Pt c/o depressive moods that began in 2017. His PHQ-9 score is 18. He states his marriage is going well. Pt was fired from his job in 2017 without any explanation. He had no family support or motivation. His wife was supportive, but his siblings and children were not. Pt states he doesn't get along well with his children as much as he would like to. He never saw a therapist about this nor tried taking medications. Denies suicidal thoughts. Denies anxiety.  I got the sense that he was not very proud of himself and when I asked him this he started crying and does admit to feeling like a failure.    He states he was taking Losartan 50 mg from [place] and ran out 4 days ago. Pt has been taking it for 6 months. He didn't complete any kidney function panel recently. I discussed this with him.       Wt Readings from Last 6 Encounters:   03/09/24 187 lb   12/03/22 183 lb   08/17/22 185 lb   10/11/21 182 lb 4 oz   05/15/19 183 lb   02/16/19 183 lb       BMI Readings from Last 6 Encounters:   03/09/24 27.62 kg/m²   12/03/22 27.02 kg/m²   08/17/22 27.32 kg/m²   10/11/21 26.91 kg/m²   05/15/19 27.02 kg/m²   02/16/19 26.64 kg/m²       BP Readings from Last 6 Encounters:   03/09/24 160/80   12/05/23 139/74   12/03/22 159/81   08/17/22 141/82   10/11/21 134/70   11/05/20 125/68         Review of Systems   Respiratory:  Negative for shortness of breath.    Cardiovascular:  Negative for chest pain.   Psychiatric/Behavioral:  Positive for dysphoric mood. Negative for suicidal ideas. The patient is not nervous/anxious.            Medical History:      Past Medical History:   Diagnosis Date    Chicken pox     Diverticulosis 2013    Essential hypertension     Hyperlipidemia     Lipid  screening 07/18/2013    Per [employer]    PUD (peptic ulcer disease) 1998       No past surgical history on file.       No current outpatient medications on file.     Allergies:No Known Allergies     Physical Exam:       Physical Exam  Blood pressure 156/86, pulse 68, height 5' 9\" (1.753 m), weight 187 lb.    Vitals:    03/09/24 1005 03/09/24 1020   BP: 156/86 160/80   Pulse: 68    Weight: 187 lb    Height: 5' 9\" (1.753 m)        Physical Exam   Constitutional: Patient is oriented to person, place, and time. Patient appears well-developed and well-nourished. No distress.   Head: Normocephalic.   Eyes: Conjunctivae and EOM are normal.   Neurological: Patient is alert and oriented to person, place, and time.   Skin: Skin is warm.   Psychiatry: Depressed mood.  Not suicidal.  Tearful talking about how he feels about himself.  Very nice gentleman.  Heart regular rate and rhythm without murmurs  Lungs clear to auscultation.    Vitals reviewed.           Assessment/Plan:      Diagnoses and all orders for this visit:    Depressive disorder  -     OP REFERRAL TO [facility]  -     sertraline 50 MG Oral Tab; Take 1/2 tablet po daily for 1 week and then go to 1 full tablet each day from then on.  (for mood)  He needs to see a therapist and I went ahead and did a referral.  I also discussed the benefits of the medication such as sertraline which she will start.  Positive screening for depression on 9-item Patient Health Questionnaire (PHQ-9)  -     OP REFERRAL TO [facility]  -     sertraline 50 MG Oral Tab; Take 1/2 tablet po daily for 1 week and then go to 1 full tablet each day from then on.  (for mood)    Essential hypertension  -     amLODIPine Besy-Benazepril HCl 5-20 MG Oral Cap; Take 1 capsule by mouth daily. For blood pressure.  -     Basic Metabolic Panel (8); Future  -     Urinalysis with Culture Reflex; Future  I do not think losartan 50 mg is going to control his blood pressure and started him on Lotrel 5/20 mg instead and  we will get some lab work done today.  He would like to follow-up with me in 5 weeks, sooner if any issues.  Hyperglycemia  -     Hemoglobin A1C; Future        Referrals (if applicable)  No orders of the defined types were placed in this encounter.      Follow up if symptoms persist.  Take medicine (if given) as prescribed.  Approach to treatment discussed and patient/family member understands and agrees to plan.     Return in about 5 weeks (around 4/13/2024) for Mood Medicine Followup, High Blood Pressure followup.    There are no Patient Instructions on file for this visit.    Elver Collier    3/9/2024    By signing my name below, IElver,  attest that this documentation has been prepared under the direction and in the presence of Jose Izaguirre DO.   Electronically Signed: Elver Collier, 3/9/2024, 10:07 AM.      I, Jose Izaguirre DO,  personally performed the services described in this documentation. All medical record entries made by the scribe were at my direction and in my presence.  I have reviewed the chart and discharge instructions (if applicable) and agree that the record reflects my personal performance and is accurate and complete.  Jose Izaguirre DO, 3/9/2024, 10:33 AM

## 2024-06-06 DIAGNOSIS — I10 ESSENTIAL HYPERTENSION: Chronic | ICD-10-CM

## 2024-06-10 RX ORDER — AMLODIPINE BESYLATE AND BENAZEPRIL HYDROCHLORIDE 5; 20 MG/1; MG/1
1 CAPSULE ORAL DAILY
Qty: 90 CAPSULE | Refills: 0 | OUTPATIENT
Start: 2024-06-10

## 2024-06-10 NOTE — TELEPHONE ENCOUNTER
Please review.  Protocol failed / Has no protocol.     Requested Prescriptions   Pending Prescriptions Disp Refills    AMLODIPINE BESY-BENAZEPRIL HCL 5-20 MG Oral Cap [Pharmacy Med Name: AMLODIPINE-BENAZ 5/20MG CAPSULES] 90 capsule 0     Sig: Take 1 capsule by mouth daily. For blood pressure.       Hypertension Medications Protocol Failed - 6/10/2024 10:01 AM        Failed - Last BP reading less than 140/90     BP Readings from Last 1 Encounters:   03/09/24 160/80               Passed - CMP or BMP in past 12 months        Passed - In person appointment or virtual visit in the past 12 mos or appointment in next 3 mos     Recent Outpatient Visits              3 months ago Depressive disorder    [facility]    Office Visit    1 year ago Physical exam    [facility]    Office Visit    2 years ago Need for shingles vaccine    [facility]    Nurse Only    2 years ago Immunization due    [facility]    [facility]    2 years ago Physical exam    [facility] [place]    Office Visit                      Passed - EGFRCR or GFRNAA > 50     GFR Evaluation  EGFRCR: 89 , resulted on 3/9/2024               Recent Outpatient Visits              3 months ago Depressive disorder    [facility]    Office Visit    1 year ago Physical exam    [facility] [place]    Office Visit    2 years ago Need for shingles vaccine    [facility]    2 years ago Immunization due    [facility]    2 years ago Physical exam    [facility]  [place]    Office Visit

## 2024-06-10 NOTE — TELEPHONE ENCOUNTER
Return in about 5 weeks (around 4/13/2024) for Mood Medicine Followup, High Blood Pressure followup.  After Visit Summary (Printed 3/9/2024)

## 2024-06-11 NOTE — TELEPHONE ENCOUNTER
Patient's spouse called back on this.  Patient is completely out of medication.  Most recently seen and prescribed by Dr. Izaguirre.  Please advise on refill request.

## 2024-06-13 RX ORDER — AMLODIPINE BESYLATE AND BENAZEPRIL HYDROCHLORIDE 5; 20 MG/1; MG/1
1 CAPSULE ORAL DAILY
Qty: 10 CAPSULE | Refills: 0 | Status: SHIPPED | OUTPATIENT
Start: 2024-06-13

## 2024-06-13 NOTE — TELEPHONE ENCOUNTER
Patient called back in [place]; he states he is returning our call. He is asking why he is only getting 10 pills. He states he does want to see Dr. Easton for follow-up visit, but work schedule makes it hard to schedule visit [Monday-Friday from 8 am - 8 pm. He is asking for an office visit this Saturday at any time. I made him aware I will convey the above to Dr. Easton. Patient verbalized understanding. No further questions or concerns at this time.    Dr. Easton to advise office visit time for this Saturday, 6/15/24

## 2024-07-13 ENCOUNTER — OFFICE VISIT (OUTPATIENT)
Dept: FAMILY MEDICINE CLINIC | Facility: CLINIC | Age: 64
End: 2024-07-13
Payer: COMMERCIAL

## 2024-07-13 VITALS
DIASTOLIC BLOOD PRESSURE: 80 MMHG | HEART RATE: 70 BPM | WEIGHT: 178 LBS | HEIGHT: 69 IN | SYSTOLIC BLOOD PRESSURE: 140 MMHG | BODY MASS INDEX: 26.36 KG/M2

## 2024-07-13 DIAGNOSIS — R73.03 PREDIABETES: ICD-10-CM

## 2024-07-13 DIAGNOSIS — I10 HYPERTENSION, UNSPECIFIED TYPE: ICD-10-CM

## 2024-07-13 DIAGNOSIS — Z12.11 SCREENING FOR MALIGNANT NEOPLASM OF COLON: ICD-10-CM

## 2024-07-13 DIAGNOSIS — I10 ESSENTIAL HYPERTENSION: Chronic | ICD-10-CM

## 2024-07-13 DIAGNOSIS — Z00.00 WELL ADULT EXAM: Primary | ICD-10-CM

## 2024-07-13 DIAGNOSIS — E55.9 VITAMIN D DEFICIENCY: ICD-10-CM

## 2024-07-13 RX ORDER — AMLODIPINE BESYLATE AND BENAZEPRIL HYDROCHLORIDE 5; 20 MG/1; MG/1
1 CAPSULE ORAL DAILY
Qty: 90 CAPSULE | Refills: 0 | Status: SHIPPED | OUTPATIENT
Start: 2024-07-13

## 2024-07-13 NOTE — PROGRESS NOTES
HPI    Patient presents for annual physical.  Positive for past medical history; hypertension, depression, vitamin d deficiency, prediabetes.      Nocturia - negative.   Colonoscopy - never has completed, agreeable to cologuard.   Diet - diet is fair. Eats home cooked meals.   Exercise - walks a lot at work, otherwise sedentary.   Immunizations - up to date.     Review of Systems   All other systems reviewed and are negative.       Vitals:    07/13/24 0718   BP: 148/78   Pulse: 70   Weight: 178 lb (80.7 kg)   Height: 5' 9\" (1.753 m)     Body mass index is 26.29 kg/m².    Health Maintenance   Topic Date Due    COVID-19 Vaccine (3 - 2023-24 season) 09/01/2023    Annual Physical  12/03/2023    Colorectal Cancer Screening  12/17/2023    HTN: BP Follow-Up  04/09/2024    Influenza Vaccine (1) 10/01/2024    PSA  12/17/2024    DTaP,Tdap,and Td Vaccines (2 - Td or Tdap) 07/06/2029    Annual Depression Screening  Completed    Zoster Vaccines  Completed    Pneumococcal Vaccine: Birth to 64yrs  Aged Out       Past Medical History:    Chicken pox    Diverticulosis    Essential hypertension    Hyperlipidemia    Lipid screening    Per [facility]    PUD (peptic ulcer disease)       .History reviewed. No pertinent surgical history.    Family History   Problem Relation Age of Onset    Stroke Mother         CVA x2  (cause of death)    Hypertension Mother     Lipids Mother         Hyperlipidemia    Diabetes Mother         Type 2    Other (Other) Father         Medical history unknown       Social History     Socioeconomic History    Marital status:      Spouse name: Not on file    Number of children: Not on file    Years of education: Not on file    Highest education level: Not on file   Occupational History    Not on file   Tobacco Use    Smoking status: Former    Smokeless tobacco: Former    Tobacco comments:     Quit >30   Vaping Use    Vaping status: Never Used   Substance and Sexual Activity    Alcohol use: Yes      Alcohol/week: 0.0 standard drinks of alcohol     Comment: occ    Drug use: No    Sexual activity: Not on file   Other Topics Concern    Grew up on a farm Not Asked    History of tanning Not Asked    Outdoor occupation Not Asked    Reaction to local anesthetic No   Social History Narrative    Not on file     Social Determinants of Health     Financial Resource Strain: Not on file   Food Insecurity: Not on file   Transportation Needs: Not on file   Physical Activity: Not on file   Stress: Not on file   Social Connections: Not on file   Housing Stability: Not on file       Current Outpatient Medications   Medication Sig Dispense Refill    amLODIPine Besy-Benazepril HCl 5-20 MG Oral Cap Take 1 capsule by mouth daily. For blood pressure. 90 capsule 0    sertraline 50 MG Oral Tab Take 1/2 tablet po daily for 1 week and then go to 1 full tablet each day from then on.  (for mood) (Patient not taking: Reported on 7/13/2024) 90 tablet 0       Allergies:  No Known Allergies    Physical Exam  Vitals and nursing note reviewed.   Constitutional:       General: He is not in acute distress.     Appearance: Normal appearance. He is well-developed. He is not ill-appearing, toxic-appearing or diaphoretic.   HENT:      Head: Normocephalic and atraumatic.      Right Ear: Hearing, tympanic membrane, ear canal and external ear normal. There is no impacted cerumen.      Left Ear: Hearing, tympanic membrane, ear canal and external ear normal. There is no impacted cerumen.      Nose: Nose normal. No congestion.      Mouth/Throat:      Mouth: Mucous membranes are moist.      Pharynx: Oropharynx is clear. No oropharyngeal exudate or posterior oropharyngeal erythema.   Eyes:      General:         Right eye: No discharge.         Left eye: No discharge.      Conjunctiva/sclera: Conjunctivae normal.   Neck:      Thyroid: No thyromegaly.   Cardiovascular:      Rate and Rhythm: Normal rate and regular rhythm.      Pulses: Normal pulses.      Heart  sounds: Normal heart sounds. No murmur heard.  Pulmonary:      Effort: Pulmonary effort is normal. No respiratory distress.      Breath sounds: Normal breath sounds. No stridor. No wheezing, rhonchi or rales.   Chest:      Chest wall: No tenderness.   Abdominal:      General: Abdomen is flat. Bowel sounds are normal. There is no distension.      Palpations: Abdomen is soft. There is no mass.      Tenderness: There is no abdominal tenderness. There is no guarding or rebound.      Hernia: No hernia is present.   Musculoskeletal:         General: Normal range of motion.      Cervical back: Normal range of motion and neck supple.   Lymphadenopathy:      Cervical: No cervical adenopathy.   Skin:     General: Skin is warm and dry.   Neurological:      Mental Status: He is alert and oriented to person, place, and time.   Psychiatric:         Mood and Affect: Mood normal.         Behavior: Behavior normal.         Thought Content: Thought content normal.         Judgment: Judgment normal.          Assessment and Plan:   Problem List Items Addressed This Visit    None  Visit Diagnoses       Well adult exam    -  Primary    Relevant Medications    amLODIPine Besy-Benazepril HCl 5-20 MG Oral Cap    Other Relevant Orders    Lipid Panel    Hemoglobin A1C    Comp Metabolic Panel (14)    CBC With Differential With Platelet    TSH W Reflex To Free T4    Vitamin D    Vitamin B12    COLOGUARD COLON CANCER SCREENING (EXTERNAL)    Essential hypertension  (Chronic)       Relevant Medications    amLODIPine Besy-Benazepril HCl 5-20 MG Oral Cap    Prediabetes        Relevant Medications    amLODIPine Besy-Benazepril HCl 5-20 MG Oral Cap    Other Relevant Orders    Hemoglobin A1C    Vitamin D deficiency        Relevant Orders    Comp Metabolic Panel (14)    Vitamin D    Hypertension, unspecified type        Relevant Medications    amLODIPine Besy-Benazepril HCl 5-20 MG Oral Cap    Screening for malignant neoplasm of colon        Relevant  Orders    COLOGUARD COLON CANCER SCREENING (EXTERNAL)           Fasting labs, cologuard ordered.  Refills of bp meds to pharmacy on file.      Discussed plan of care with patient and patient is in agreement.  All questions answered. Patient to call with questions or concerns.    Encouraged to sign up for My Chart if not already registered.

## 2024-07-20 ENCOUNTER — LAB ENCOUNTER (OUTPATIENT)
Dept: LAB | Age: 64
End: 2024-07-20
Attending: NURSE PRACTITIONER
Payer: COMMERCIAL

## 2024-07-20 DIAGNOSIS — E55.9 VITAMIN D DEFICIENCY: ICD-10-CM

## 2024-07-20 DIAGNOSIS — R73.03 PREDIABETES: ICD-10-CM

## 2024-07-20 DIAGNOSIS — Z00.00 WELL ADULT EXAM: ICD-10-CM

## 2024-07-20 LAB
ALBUMIN SERPL-MCNC: 4.3 G/DL (ref 3.2–4.8)
ALBUMIN/GLOB SERPL: 1.8 {RATIO} (ref 1–2)
ALP LIVER SERPL-CCNC: 55 U/L
ALT SERPL-CCNC: 21 U/L
ANION GAP SERPL CALC-SCNC: 7 MMOL/L (ref 0–18)
AST SERPL-CCNC: 20 U/L (ref ?–34)
BASOPHILS # BLD AUTO: 0.05 X10(3) UL (ref 0–0.2)
BASOPHILS NFR BLD AUTO: 1 %
BILIRUB SERPL-MCNC: 0.7 MG/DL (ref 0.2–1.1)
BUN BLD-MCNC: 14 MG/DL (ref 9–23)
BUN/CREAT SERPL: 13 (ref 10–20)
CALCIUM BLD-MCNC: 9.3 MG/DL (ref 8.7–10.4)
CHLORIDE SERPL-SCNC: 110 MMOL/L (ref 98–112)
CHOLEST SERPL-MCNC: 233 MG/DL (ref ?–200)
CO2 SERPL-SCNC: 26 MMOL/L (ref 21–32)
CREAT BLD-MCNC: 1.08 MG/DL
DEPRECATED RDW RBC AUTO: 43.7 FL (ref 35.1–46.3)
EGFRCR SERPLBLD CKD-EPI 2021: 77 ML/MIN/1.73M2 (ref 60–?)
EOSINOPHIL # BLD AUTO: 0.15 X10(3) UL (ref 0–0.7)
EOSINOPHIL NFR BLD AUTO: 3 %
ERYTHROCYTE [DISTWIDTH] IN BLOOD BY AUTOMATED COUNT: 12.7 % (ref 11–15)
EST. AVERAGE GLUCOSE BLD GHB EST-MCNC: 131 MG/DL (ref 68–126)
FASTING PATIENT LIPID ANSWER: YES
FASTING STATUS PATIENT QL REPORTED: YES
GLOBULIN PLAS-MCNC: 2.4 G/DL (ref 2–3.5)
GLUCOSE BLD-MCNC: 106 MG/DL (ref 70–99)
HBA1C MFR BLD: 6.2 % (ref ?–5.7)
HCT VFR BLD AUTO: 44 %
HDLC SERPL-MCNC: 44 MG/DL (ref 40–59)
HGB BLD-MCNC: 15.2 G/DL
IMM GRANULOCYTES # BLD AUTO: 0.04 X10(3) UL (ref 0–1)
IMM GRANULOCYTES NFR BLD: 0.8 %
LDLC SERPL CALC-MCNC: 168 MG/DL (ref ?–100)
LYMPHOCYTES # BLD AUTO: 1.46 X10(3) UL (ref 1–4)
LYMPHOCYTES NFR BLD AUTO: 29.2 %
MCH RBC QN AUTO: 32.1 PG (ref 26–34)
MCHC RBC AUTO-ENTMCNC: 34.5 G/DL (ref 31–37)
MCV RBC AUTO: 92.8 FL
MONOCYTES # BLD AUTO: 0.41 X10(3) UL (ref 0.1–1)
MONOCYTES NFR BLD AUTO: 8.2 %
NEUTROPHILS # BLD AUTO: 2.89 X10 (3) UL (ref 1.5–7.7)
NEUTROPHILS # BLD AUTO: 2.89 X10(3) UL (ref 1.5–7.7)
NEUTROPHILS NFR BLD AUTO: 57.8 %
NONHDLC SERPL-MCNC: 189 MG/DL (ref ?–130)
OSMOLALITY SERPL CALC.SUM OF ELEC: 297 MOSM/KG (ref 275–295)
PLATELET # BLD AUTO: 217 10(3)UL (ref 150–450)
POTASSIUM SERPL-SCNC: 4.8 MMOL/L (ref 3.5–5.1)
PROT SERPL-MCNC: 6.7 G/DL (ref 5.7–8.2)
RBC # BLD AUTO: 4.74 X10(6)UL
SODIUM SERPL-SCNC: 143 MMOL/L (ref 136–145)
TRIGL SERPL-MCNC: 116 MG/DL (ref 30–149)
TSI SER-ACNC: 2.65 MIU/ML (ref 0.55–4.78)
VIT B12 SERPL-MCNC: 396 PG/ML (ref 211–911)
VIT D+METAB SERPL-MCNC: 23.7 NG/ML (ref 30–100)
VLDLC SERPL CALC-MCNC: 23 MG/DL (ref 0–30)
WBC # BLD AUTO: 5 X10(3) UL (ref 4–11)

## 2024-07-20 PROCEDURE — 85025 COMPLETE CBC W/AUTO DIFF WBC: CPT

## 2024-07-20 PROCEDURE — 82607 VITAMIN B-12: CPT

## 2024-07-20 PROCEDURE — 83036 HEMOGLOBIN GLYCOSYLATED A1C: CPT

## 2024-07-20 PROCEDURE — 80053 COMPREHEN METABOLIC PANEL: CPT

## 2024-07-20 PROCEDURE — 82306 VITAMIN D 25 HYDROXY: CPT

## 2024-07-20 PROCEDURE — 80061 LIPID PANEL: CPT

## 2024-07-20 PROCEDURE — 84443 ASSAY THYROID STIM HORMONE: CPT

## 2024-07-20 PROCEDURE — 36415 COLL VENOUS BLD VENIPUNCTURE: CPT

## 2024-07-25 DIAGNOSIS — E78.2 MIXED HYPERLIPIDEMIA: Primary | ICD-10-CM

## 2024-07-25 RX ORDER — ATORVASTATIN CALCIUM 40 MG/1
40 TABLET, FILM COATED ORAL NIGHTLY
Qty: 90 TABLET | Refills: 3 | Status: SHIPPED | OUTPATIENT
Start: 2024-07-25

## 2024-11-19 DIAGNOSIS — I10 ESSENTIAL HYPERTENSION: Chronic | ICD-10-CM

## 2024-11-19 DIAGNOSIS — I10 HYPERTENSION, UNSPECIFIED TYPE: ICD-10-CM

## 2024-11-19 DIAGNOSIS — Z00.00 WELL ADULT EXAM: ICD-10-CM

## 2024-11-23 ENCOUNTER — OFFICE VISIT (OUTPATIENT)
Dept: FAMILY MEDICINE CLINIC | Facility: CLINIC | Age: 64
End: 2024-11-23

## 2024-11-23 VITALS
HEART RATE: 78 BPM | SYSTOLIC BLOOD PRESSURE: 139 MMHG | HEIGHT: 69 IN | WEIGHT: 184.63 LBS | BODY MASS INDEX: 27.35 KG/M2 | DIASTOLIC BLOOD PRESSURE: 77 MMHG

## 2024-11-23 DIAGNOSIS — I10 ESSENTIAL HYPERTENSION: Primary | ICD-10-CM

## 2024-11-23 PROCEDURE — 99213 OFFICE O/P EST LOW 20 MIN: CPT | Performed by: NURSE PRACTITIONER

## 2024-11-23 RX ORDER — AMLODIPINE AND BENAZEPRIL HYDROCHLORIDE 5; 20 MG/1; MG/1
1 CAPSULE ORAL DAILY
Qty: 90 CAPSULE | Refills: 0 | OUTPATIENT
Start: 2024-11-23

## 2024-11-23 RX ORDER — ATORVASTATIN CALCIUM 40 MG/1
40 TABLET, FILM COATED ORAL NIGHTLY
COMMUNITY

## 2024-11-23 RX ORDER — LOSARTAN POTASSIUM 50 MG/1
TABLET ORAL DAILY
COMMUNITY
End: 2024-11-23

## 2024-11-23 RX ORDER — AMLODIPINE AND BENAZEPRIL HYDROCHLORIDE 5; 20 MG/1; MG/1
1 CAPSULE ORAL DAILY
Qty: 90 CAPSULE | Refills: 3 | Status: SHIPPED | OUTPATIENT
Start: 2024-11-23

## 2024-11-23 NOTE — PROGRESS NOTES
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  Went yesterday and had an elevated blood pressure.  Stopped taking amlodipine/benazepril as well as atorvastatin.  Started taking losartan 50 mg which he states he bought in [place].      Review of Systems   All other systems reviewed and are negative.       Vitals:    11/23/24 0831   BP: 139/77   Pulse: 78   Weight: 184 lb 9.6 oz (83.7 kg)   Height: 5' 9\" (1.753 m)     Body mass index is 27.26 kg/m².    Health Maintenance   Topic Date Due    Colorectal Cancer Screening  12/17/2023    Fall Risk Screening (Annual)  01/01/2024    HTN: BP Follow-Up  08/13/2024    COVID-19 Vaccine (3 - 2024-25 season) 09/01/2024    Influenza Vaccine (1) 10/01/2024    Pneumococcal Vaccine: 65+ Years (1 of 1 - PCV) Never done    PSA  12/17/2024    Annual Physical  07/13/2025    Annual Depression Screening  Completed    Zoster Vaccines  Completed       Past Medical History:    Chicken pox    Diverticulosis    Essential hypertension    Hyperlipidemia    Lipid screening    Per [facility]    PUD (peptic ulcer disease)       .No past surgical history on file.    Family History   Problem Relation Age of Onset    Stroke Mother         CVA x2  (cause of death)    Hypertension Mother     Lipids Mother         Hyperlipidemia    Diabetes Mother         Type 2    Other (Other) Father         Medical history unknown       Social History     Socioeconomic History    Marital status:      Spouse name: Not on file    Number of children: Not on file    Years of education: Not on file    Highest education level: Not on file   Occupational History    Not on file   Tobacco Use    Smoking status: Former    Smokeless tobacco: Former    Tobacco comments:     Quit >30   Vaping Use    Vaping status: Never Used   Substance and Sexual Activity    Alcohol use: Yes     Alcohol/week: 0.0 standard drinks of alcohol     Comment: occ    Drug use: No    Sexual activity: Not on file   Other Topics Concern    Grew  up on a farm Not Asked    History of tanning Not Asked    Outdoor occupation Not Asked    Reaction to local anesthetic No   Social History Narrative    Not on file     Social Drivers of Health     Financial Resource Strain: Not on file   Food Insecurity: Not on file   Transportation Needs: Not on file   Physical Activity: Not on file   Stress: Not on file   Social Connections: Not on file   Housing Stability: Not on file       Current Outpatient Medications   Medication Sig Dispense Refill    amLODIPine Besy-Benazepril HCl 5-20 MG Oral Cap Take 1 capsule by mouth daily. For blood pressure. 90 capsule 3    atorvastatin 40 MG Oral Tab Take 1 tablet (40 mg total) by mouth nightly.         Allergies:  Allergies[1]    Physical Exam  Vitals and nursing note reviewed.   Constitutional:       General: He is not in acute distress.     Appearance: Normal appearance. He is not ill-appearing.   HENT:      Head: Normocephalic and atraumatic.   Cardiovascular:      Rate and Rhythm: Normal rate and regular rhythm.      Heart sounds: Normal heart sounds. No murmur heard.  Pulmonary:      Effort: Pulmonary effort is normal. No respiratory distress.      Breath sounds: Normal breath sounds. No stridor. No wheezing, rhonchi or rales.   Chest:      Chest wall: No tenderness.   Neurological:      Mental Status: He is alert and oriented to person, place, and time.          Assessment and Plan:   Problem List Items Addressed This Visit    None  Visit Diagnoses       Essential hypertension    -  Primary    Relevant Medications    amLODIPine Besy-Benazepril HCl 5-20 MG Oral Cap           Discontinue losartan, restart amlodipine/benazepril as well as atorvastatin.  Follow up in 3 weeks.      Discussed plan of care with patient and patient is in agreement.  All questions answered. Patient to call with questions or concerns.    Encouraged to sign up for My Chart if not already registered.        [1] No Known Allergies

## 2024-12-14 ENCOUNTER — OFFICE VISIT (OUTPATIENT)
Dept: FAMILY MEDICINE CLINIC | Facility: CLINIC | Age: 64
End: 2024-12-14

## 2024-12-14 VITALS
HEART RATE: 71 BPM | BODY MASS INDEX: 27.25 KG/M2 | HEIGHT: 69 IN | SYSTOLIC BLOOD PRESSURE: 146 MMHG | WEIGHT: 184 LBS | DIASTOLIC BLOOD PRESSURE: 63 MMHG

## 2024-12-14 DIAGNOSIS — I10 ESSENTIAL HYPERTENSION: Primary | ICD-10-CM

## 2024-12-14 PROCEDURE — 99213 OFFICE O/P EST LOW 20 MIN: CPT | Performed by: NURSE PRACTITIONER

## 2024-12-14 RX ORDER — AMLODIPINE AND VALSARTAN 10; 160 MG/1; MG/1
1 TABLET ORAL DAILY
Qty: 30 TABLET | Refills: 0 | Status: SHIPPED | OUTPATIENT
Start: 2024-12-14

## 2024-12-14 NOTE — PROGRESS NOTES
HPI    Patient presents for blood pressure follow-up.  Seen on 11/23 at which time he was restarted on amlodipine/benazepril.  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  He reported that he discontinued his prescribed medicatio and that he started losartan which he got in [place].  Has not been tracking blood pressures at home as he was advised to do.  Has 1 blood pressure reading from this morning which was 147/83.    Review of Systems   All other systems reviewed and are negative.       Vitals:    12/14/24 0907   BP: 146/63   Pulse: 71   Weight: 184 lb (83.5 kg)   Height: 5' 9\" (1.753 m)     Body mass index is 27.17 kg/m².    Health Maintenance   Topic Date Due    Colorectal Cancer Screening  12/17/2023    COVID-19 Vaccine (3 - 2024-25 season) 09/01/2024    Influenza Vaccine (1) 10/01/2024    Pneumococcal Vaccine: 65+ Years (1 of 1 - PCV) Never done    PSA  12/17/2024    Annual Physical  07/13/2025    Annual Depression Screening  Completed    Fall Risk Screening (Annual)  Completed    Zoster Vaccines  Completed       Past Medical History:    Chicken pox    Diverticulosis    Essential hypertension    Hyperlipidemia    Lipid screening    Per [facility]    PUD (peptic ulcer disease)       .No past surgical history on file.    Family History   Problem Relation Age of Onset    Stroke Mother         CVA x2  (cause of death)    Hypertension Mother     Lipids Mother         Hyperlipidemia    Diabetes Mother         Type 2    Other (Other) Father         Medical history unknown       Social History     Socioeconomic History    Marital status:      Spouse name: Not on file    Number of children: Not on file    Years of education: Not on file    Highest education level: Not on file   Occupational History    Not on file   Tobacco Use    Smoking status: Former    Smokeless tobacco: Former    Tobacco comments:     Quit >30   Vaping Use    Vaping status: Never Used   Substance and  Sexual Activity    Alcohol use: Yes     Alcohol/week: 0.0 standard drinks of alcohol     Comment: occ    Drug use: No    Sexual activity: Not on file   Other Topics Concern    Grew up on a farm Not Asked    History of tanning Not Asked    Outdoor occupation Not Asked    Reaction to local anesthetic No   Social History Narrative    Not on file     Social Drivers of Health     Financial Resource Strain: Not on file   Food Insecurity: Not on file   Transportation Needs: Not on file   Physical Activity: Not on file   Stress: Not on file   Social Connections: Not on file   Housing Stability: Not on file       Current Outpatient Medications   Medication Sig Dispense Refill    amLODIPine Besylate-Valsartan  MG Oral Tab Take 1 tablet by mouth daily. 30 tablet 0    atorvastatin 40 MG Oral Tab Take 1 tablet (40 mg total) by mouth nightly.         Allergies:  Allergies[1]    Physical Exam  Vitals and nursing note reviewed.   Constitutional:       General: He is not in acute distress.     Appearance: Normal appearance.   Cardiovascular:      Rate and Rhythm: Normal rate and regular rhythm.      Heart sounds: Normal heart sounds.   Pulmonary:      Effort: Pulmonary effort is normal. No respiratory distress.      Breath sounds: Normal breath sounds. No stridor. No wheezing, rhonchi or rales.   Chest:      Chest wall: No tenderness.   Neurological:      Mental Status: He is alert and oriented to person, place, and time.          Assessment and Plan:   Problem List Items Addressed This Visit    None  Visit Diagnoses       Essential hypertension    -  Primary    Relevant Medications    amLODIPine Besylate-Valsartan  MG Oral Tab           Discontinue amlodipine 5 mg/benazepril 20 mg.  Changed to amlodipine 10 mg/valsartan 160 mg once daily.  Informed patient that he should keep a blood pressure log and check twice daily.  Stressed the importance of following up with blood pressure log in 2 weeks.  Unable to clear patient  for DOT until he is normotensive.  Understanding verbalized.     Discussed plan of care with patient and patient is in agreement.  All questions answered. Patient to call with questions or concerns.    Encouraged to sign up for My Chart if not already registered.        [1] No Known Allergies

## 2024-12-28 ENCOUNTER — OFFICE VISIT (OUTPATIENT)
Dept: FAMILY MEDICINE CLINIC | Facility: CLINIC | Age: 64
End: 2024-12-28

## 2024-12-28 VITALS
DIASTOLIC BLOOD PRESSURE: 77 MMHG | HEIGHT: 69 IN | HEART RATE: 79 BPM | SYSTOLIC BLOOD PRESSURE: 153 MMHG | WEIGHT: 186.81 LBS | BODY MASS INDEX: 27.67 KG/M2

## 2024-12-28 DIAGNOSIS — I10 ESSENTIAL HYPERTENSION: Primary | ICD-10-CM

## 2024-12-28 PROCEDURE — 99213 OFFICE O/P EST LOW 20 MIN: CPT | Performed by: NURSE PRACTITIONER

## 2024-12-28 RX ORDER — METOPROLOL SUCCINATE 50 MG/1
50 TABLET, EXTENDED RELEASE ORAL DAILY
Qty: 30 TABLET | Refills: 0 | Status: SHIPPED | OUTPATIENT
Start: 2024-12-28

## 2024-12-28 NOTE — PROGRESS NOTES
HPI    Patient presents for blood pressure follow up.  Recently increased and changed bp medication.  Still with some increased numbers at home.  Numbers averaging 130-150s/70-90s.      Review of Systems   All other systems reviewed and are negative.       Vitals:    12/28/24 0856   BP: 153/77   Pulse: 79   Weight: 186 lb 12.8 oz (84.7 kg)   Height: 5' 9\" (1.753 m)     Body mass index is 27.59 kg/m².    Health Maintenance   Topic Date Due    Colorectal Cancer Screening  12/17/2023    COVID-19 Vaccine (3 - 2024-25 season) 09/01/2024    Influenza Vaccine (1) 10/01/2024    Pneumococcal Vaccine: 65+ Years (1 of 1 - PCV) Never done    PSA  12/17/2024    HTN: BP Follow-Up  01/14/2025    Annual Physical  07/13/2025    Annual Depression Screening  Completed    Fall Risk Screening (Annual)  Completed    Zoster Vaccines  Completed       Past Medical History:    Chicken pox    Diverticulosis    Essential hypertension    Hyperlipidemia    Lipid screening    Per [facility]    PUD (peptic ulcer disease)       .History reviewed. No pertinent surgical history.    Family History   Problem Relation Age of Onset    Stroke Mother         CVA x2  (cause of death)    Hypertension Mother     Lipids Mother         Hyperlipidemia    Diabetes Mother         Type 2    Other (Other) Father         Medical history unknown       Social History     Socioeconomic History    Marital status:      Spouse name: Not on file    Number of children: Not on file    Years of education: Not on file    Highest education level: Not on file   Occupational History    Not on file   Tobacco Use    Smoking status: Former    Smokeless tobacco: Former    Tobacco comments:     Quit >30   Vaping Use    Vaping status: Never Used   Substance and Sexual Activity    Alcohol use: Yes     Alcohol/week: 0.0 standard drinks of alcohol     Comment: occ    Drug use: No    Sexual activity: Not on file   Other Topics Concern    Grew up on a farm Not Asked    History of tanning  Not Asked    Outdoor occupation Not Asked    Reaction to local anesthetic No   Social History Narrative    Not on file     Social Drivers of Health     Financial Resource Strain: Not on file   Food Insecurity: Not on file   Transportation Needs: Not on file   Physical Activity: Not on file   Stress: Not on file   Social Connections: Not on file   Housing Stability: Not on file       Current Outpatient Medications   Medication Sig Dispense Refill    metoprolol succinate ER 50 MG Oral Tablet 24 Hr Take 1 tablet (50 mg total) by mouth daily. 30 tablet 0    amLODIPine Besylate-Valsartan  MG Oral Tab Take 1 tablet by mouth daily. 30 tablet 0    atorvastatin 40 MG Oral Tab Take 1 tablet (40 mg total) by mouth nightly.         Allergies:  Allergies[1]    Physical Exam  Vitals and nursing note reviewed.   Constitutional:       General: He is not in acute distress.     Appearance: Normal appearance.   Cardiovascular:      Rate and Rhythm: Normal rate and regular rhythm.      Heart sounds: Normal heart sounds. No murmur heard.  Pulmonary:      Effort: Pulmonary effort is normal. No respiratory distress.      Breath sounds: Normal breath sounds. No stridor. No wheezing, rhonchi or rales.   Chest:      Chest wall: No tenderness.   Neurological:      Mental Status: He is alert and oriented to person, place, and time.          Assessment and Plan:   Problem List Items Addressed This Visit    None  Visit Diagnoses       Essential hypertension    -  Primary    Relevant Medications    metoprolol succinate ER 50 MG Oral Tablet 24 Hr           Add metoprolol once daily.  Follow upw [facility] log in 2 weeks.     Discussed plan of care with patient and patient is in agreement.  All questions answered. Patient to call with questions or concerns.    Encouraged to sign up for My Chart if not already registered.        [1] No Known Allergies

## 2025-01-11 ENCOUNTER — OFFICE VISIT (OUTPATIENT)
Dept: FAMILY MEDICINE CLINIC | Facility: CLINIC | Age: 65
End: 2025-01-11

## 2025-01-11 VITALS
HEIGHT: 69 IN | HEART RATE: 88 BPM | SYSTOLIC BLOOD PRESSURE: 124 MMHG | BODY MASS INDEX: 27.4 KG/M2 | DIASTOLIC BLOOD PRESSURE: 70 MMHG | WEIGHT: 185 LBS

## 2025-01-11 DIAGNOSIS — I10 ESSENTIAL HYPERTENSION: ICD-10-CM

## 2025-01-11 PROCEDURE — 99213 OFFICE O/P EST LOW 20 MIN: CPT | Performed by: NURSE PRACTITIONER

## 2025-01-11 RX ORDER — AMLODIPINE AND VALSARTAN 10; 160 MG/1; MG/1
1 TABLET ORAL DAILY
Qty: 90 TABLET | Refills: 1 | Status: SHIPPED | OUTPATIENT
Start: 2025-01-11

## 2025-01-11 RX ORDER — METOPROLOL SUCCINATE 50 MG/1
50 TABLET, EXTENDED RELEASE ORAL DAILY
Qty: 90 TABLET | Refills: 1 | Status: SHIPPED | OUTPATIENT
Start: 2025-01-11

## 2025-01-11 NOTE — PROGRESS NOTES
HPI    Patient presents for blood pressure follow-up.  Has been checking blood pressures at home and numbers have been averaging in the 130s-70s.      Review of Systems   All other systems reviewed and are negative.       Vitals:    01/11/25 0900 01/11/25 0913   BP: 146/80 124/70   Pulse: 88    Weight: 185 lb (83.9 kg)    Height: 5' 9\" (1.753 m)      Body mass index is 27.32 kg/m².    Health Maintenance   Topic Date Due    Pneumococcal Vaccine: 50+ Years (1 of 1 - PCV) Never done    Colorectal Cancer Screening  12/17/2023    COVID-19 Vaccine (3 - 2024-25 season) 09/01/2024    Influenza Vaccine (1) 10/01/2024    Annual Depression Screening  01/01/2025    Fall Risk Screening (Annual)  01/01/2025    PSA  12/17/2024    HTN: BP Follow-Up  01/28/2025    Annual Physical  07/13/2025    Zoster Vaccines  Completed    Meningococcal B Vaccine  Aged Out       Past Medical History:    Chicken pox    Diverticulosis    Essential hypertension    Hyperlipidemia    Lipid screening    Per [facility]    PUD (peptic ulcer disease)       .No past surgical history on file.    Family History   Problem Relation Age of Onset    Stroke Mother         CVA x2  (cause of death)    Hypertension Mother     Lipids Mother         Hyperlipidemia    Diabetes Mother         Type 2    Other (Other) Father         Medical history unknown       Social History     Socioeconomic History    Marital status:      Spouse name: Not on file    Number of children: Not on file    Years of education: Not on file    Highest education level: Not on file   Occupational History    Not on file   Tobacco Use    Smoking status: Former    Smokeless tobacco: Former    Tobacco comments:     Quit >30   Vaping Use    Vaping status: Never Used   Substance and Sexual Activity    Alcohol use: Yes     Alcohol/week: 0.0 standard drinks of alcohol     Comment: occ    Drug use: No    Sexual activity: Not on file   Other Topics Concern    Grew up on a farm Not Asked    History of  tanning Not Asked    Outdoor occupation Not Asked    Reaction to local anesthetic No   Social History Narrative    Not on file     Social Drivers of Health     Financial Resource Strain: Not on file   Food Insecurity: Not on file   Transportation Needs: Not on file   Physical Activity: Not on file   Stress: Not on file   Social Connections: Not on file   Housing Stability: Not on file       Current Outpatient Medications   Medication Sig Dispense Refill    metoprolol succinate ER 50 MG Oral Tablet 24 Hr Take 1 tablet (50 mg total) by mouth daily. 90 tablet 1    amLODIPine Besylate-Valsartan  MG Oral Tab Take 1 tablet by mouth daily. 90 tablet 1    atorvastatin 40 MG Oral Tab Take 1 tablet (40 mg total) by mouth nightly.         Allergies:  Allergies[1]    Physical Exam  Vitals reviewed.   Constitutional:       General: He is not in acute distress.     Appearance: Normal appearance.   HENT:      Head: Normocephalic and atraumatic.   Cardiovascular:      Rate and Rhythm: Normal rate and regular rhythm.      Heart sounds: Normal heart sounds.   Pulmonary:      Effort: Pulmonary effort is normal. No respiratory distress.      Breath sounds: Normal breath sounds. No stridor. No wheezing, rhonchi or rales.   Chest:      Chest wall: No tenderness.   Neurological:      Mental Status: He is alert and oriented to person, place, and time.          Assessment and Plan:   Problem List Items Addressed This Visit    None  Visit Diagnoses       Essential hypertension        Relevant Medications    metoprolol succinate ER 50 MG Oral Tablet 24 Hr    amLODIPine Besylate-Valsartan  MG Oral Tab           Refills of medications to pharmacy on file.  Patient would like to follow-up in 2 weeks to ensure that blood pressures continue to be controlled prior to DOT physical follow-up.    Discussed plan of care with patient and patient is in agreement.  All questions answered. Patient to call with questions or  concerns.    Encouraged to sign up for My Chart if not already registered.        [1] No Known Allergies

## 2025-07-17 ENCOUNTER — MED REC SCAN ONLY (OUTPATIENT)
Dept: FAMILY MEDICINE CLINIC | Facility: CLINIC | Age: 65
End: 2025-07-17

## 2025-08-02 DIAGNOSIS — I10 ESSENTIAL HYPERTENSION: ICD-10-CM

## 2025-08-05 RX ORDER — AMLODIPINE AND VALSARTAN 10; 160 MG/1; MG/1
1 TABLET ORAL DAILY
Qty: 90 TABLET | Refills: 0 | Status: SHIPPED | OUTPATIENT
Start: 2025-08-05

## (undated) NOTE — LETTER
11/12/2020          To Whom It May Concern:    Tr Lay is currently under my medical care. He might return to work on 11/18/2020. If you require additional information please contact our office.         Sincerely,    Curt Shane PA-C

## (undated) NOTE — LETTER
Date & Time: 12/5/2023, 8:56 AM  Patient: Jose Antonio Gorman  Encounter Provider(s):    DENIS Fonseca       To Whom It May Concern:    Jose Antonio Gorman was seen and treated in our department on 12/5/2023. He can return to work 12/6/2023.     If you have any questions or concerns, please do not hesitate to call.        _____________________________  Physician/APC Signature

## (undated) NOTE — LETTER
July 20, 2018     Leighann Santos      Dear Rusty Salazar:    Below are the results from your recent visit:    Resulted Orders   PSA TOTAL W REFLEX TO FREE   Result Value Ref Range    PSA 1.2 0.0 - 4.0 ng/mL   URINE [address]

## (undated) NOTE — ED AVS SNAPSHOT
Satinder Srivastava   MRN: [id number]    Department:  [facility] Emergency Department   Date of Visit:  5/15/2019           Disclosure     Insurance plans vary and the physician(s) referred by the ER may not be covered by your plan.  Please contact CARE PHYSICIAN AT ONCE OR RETURN IMMEDIATELY TO THE EMERGENCY DEPARTMENT. If you have been prescribed any medication(s), please fill your prescription right away and begin taking the medication(s) as directed.   If you believe that any of the medications

## (undated) NOTE — LETTER
Date & Time: 8/17/2022, 10:11 AM  Patient: Efren Singh  Encounter Provider(s):    DENIS Jesus       To Whom It May Concern:    Efren Singh was seen and treated in our department on 8/17/2022. He should not return to work until 8/22/22.     If you have any questions or concerns, please do not hesitate to call.        _____________________________  Physician/[facility] Signature

## (undated) NOTE — LETTER
July 31, 2018     Nahed Whitehead  [address]      Dear Alyse Torres:    Below are the results from your recent visit: the following results are within normal limits:  VIR d.      Resulted Orders   PSA TOTAL W REFLEX TO FREE   Result V